# Patient Record
Sex: FEMALE | Race: BLACK OR AFRICAN AMERICAN | NOT HISPANIC OR LATINO | URBAN - METROPOLITAN AREA
[De-identification: names, ages, dates, MRNs, and addresses within clinical notes are randomized per-mention and may not be internally consistent; named-entity substitution may affect disease eponyms.]

---

## 2022-09-02 ENCOUNTER — OFFICE VISIT (OUTPATIENT)
Dept: FAMILY MEDICINE CLINIC | Facility: CLINIC | Age: 27
End: 2022-09-02

## 2022-09-02 VITALS
OXYGEN SATURATION: 100 % | DIASTOLIC BLOOD PRESSURE: 83 MMHG | RESPIRATION RATE: 16 BRPM | SYSTOLIC BLOOD PRESSURE: 122 MMHG | TEMPERATURE: 96.3 F | HEART RATE: 65 BPM

## 2022-09-02 DIAGNOSIS — Z87.42 HISTORY OF ABNORMAL CERVICAL PAP SMEAR: ICD-10-CM

## 2022-09-02 DIAGNOSIS — R63.5 WEIGHT GAIN: ICD-10-CM

## 2022-09-02 DIAGNOSIS — F41.9 ANXIETY: ICD-10-CM

## 2022-09-02 DIAGNOSIS — Z00.00 ANNUAL PHYSICAL EXAM: Primary | ICD-10-CM

## 2022-09-02 DIAGNOSIS — Z00.00 ENCOUNTER FOR MEDICAL EXAMINATION TO ESTABLISH CARE: ICD-10-CM

## 2022-09-02 DIAGNOSIS — Z00.00 ENCOUNTER FOR PREVENTIVE CARE: ICD-10-CM

## 2022-09-02 PROCEDURE — 99213 OFFICE O/P EST LOW 20 MIN: CPT | Performed by: STUDENT IN AN ORGANIZED HEALTH CARE EDUCATION/TRAINING PROGRAM

## 2022-09-02 PROCEDURE — 99385 PREV VISIT NEW AGE 18-39: CPT | Performed by: STUDENT IN AN ORGANIZED HEALTH CARE EDUCATION/TRAINING PROGRAM

## 2022-09-03 NOTE — PROGRESS NOTES
Chief Complaint  Pt presented to clinic with   Chief Complaint   Patient presents with   • Establish Care     UNSTABLE WEIGHT STARETD ABOUT 7MOS AGO          History of Present Illness  Ms. Mcpherson 26-year-old pleasant female who presented to the clinic for the first time for establishing medical care.  Patient stated she recently has done her Pap smear with OB/GYN and she was advised to check her thyroid function test because of her symptoms.  Patient stated her periods are not regular.  Her last LMP was August 1st week and her periods comes every 2 to 2 and half month and she feels like she always have heavy bleeding.  Patient also stated she was diagnosed with anxiety in 2020.  She is not on any medication for her anxiety.  Since she has not been seen by any physician in last few years she she would like to do annual physical.  She also stated recently she has gained weight without having change in any of his diet or lifestyle.  She also noticed weight loss also in the last year  Review of Systems   Constitutional: Negative for activity change, appetite change, fatigue, fever and unexpected weight change.   HENT: Negative for congestion, rhinorrhea, sore throat and voice change.    Respiratory: Negative for cough, chest tightness, shortness of breath and wheezing.    Cardiovascular: Negative for chest pain and palpitations.   Gastrointestinal: Negative for abdominal distention, abdominal pain, diarrhea, nausea and vomiting.   Genitourinary: Positive for menstrual problem. Negative for difficulty urinating and dysuria.   Musculoskeletal: Negative for arthralgias and myalgias.   Skin: Negative for rash.   Neurological: Negative for dizziness, tremors and weakness.   Hematological: Negative for adenopathy.   Psychiatric/Behavioral: Negative for sleep disturbance. The patient is nervous/anxious.        PMH:   No outpatient medications prior to visit.     No facility-administered medications prior to visit.      No  Known Allergies  History reviewed. No pertinent surgical history.  family history is not on file.   has no history on file for tobacco use, alcohol use, and drug use.     /83 (BP Location: Left arm, Patient Position: Sitting)   Pulse 65   Temp 96.3 °F (35.7 °C) (Oral)   Resp 16   SpO2 100%   Physical Exam  HENT:      Head: Normocephalic and atraumatic.      Mouth/Throat:      Mouth: Mucous membranes are moist.      Pharynx: Oropharynx is clear.   Eyes:      Extraocular Movements: Extraocular movements intact.      Conjunctiva/sclera: Conjunctivae normal.      Pupils: Pupils are equal, round, and reactive to light.   Cardiovascular:      Rate and Rhythm: Normal rate and regular rhythm.   Pulmonary:      Effort: Pulmonary effort is normal.      Breath sounds: Normal breath sounds.   Abdominal:      General: Bowel sounds are normal.      Palpations: Abdomen is soft.   Musculoskeletal:         General: Normal range of motion.      Cervical back: Neck supple.   Skin:     General: Skin is warm.      Capillary Refill: Capillary refill takes less than 2 seconds.   Neurological:      General: No focal deficit present.      Mental Status: She is alert and oriented to person, place, and time. Mental status is at baseline.   Psychiatric:         Mood and Affect: Mood normal.          Diagnoses and all orders for this visit:    1. Annual physical exam (Primary)  Comments:  Labs have been ordered, will follow up    2. Encounter for medical examination to establish care  -     CBC Auto Differential  -     Comprehensive Metabolic Panel  -     Lipid Panel With / Chol / HDL Ratio  -     TSH+Free T4  -     Vitamin D 25 Hydroxy    3. Weight gain  Comments:  TSH has been ordered, will follow up  Orders:  -     CBC Auto Differential  -     Comprehensive Metabolic Panel  -     Lipid Panel With / Chol / HDL Ratio  -     TSH+Free T4  -     Vitamin D 25 Hydroxy    4. Anxiety  Comments:  TSH ordered, currently not on any medication  and does not think she needs medication for anxiety    5. Encounter for preventive care  Comments:  She was at her OB/GYN office few months ago, not able to find report of Pap smear, will clarify about this in the next visit with the patient    6. History of abnormal cervical Pap smear  Comments:  2018 patient has abnormal Pap and undergo cone biopsy.        Needs yearly Flu vaccine  Dental visit and exam every year  Seat Belt always when driving or riding cars  Safe sex life to avoid STD  Healthy heart diet  Exercise 3 times a week    Follow Up  3 months

## 2022-09-10 LAB
25(OH)D3+25(OH)D2 SERPL-MCNC: 28.7 NG/ML (ref 30–100)
ALBUMIN SERPL-MCNC: 4.8 G/DL (ref 3.5–5.2)
ALBUMIN/GLOB SERPL: 2.1 G/DL
ALP SERPL-CCNC: 53 U/L (ref 39–117)
ALT SERPL-CCNC: 8 U/L (ref 1–33)
AST SERPL-CCNC: 12 U/L (ref 1–32)
BASOPHILS # BLD AUTO: 0.06 10*3/MM3 (ref 0–0.2)
BASOPHILS NFR BLD AUTO: 1.1 % (ref 0–1.5)
BILIRUB SERPL-MCNC: 0.7 MG/DL (ref 0–1.2)
BUN SERPL-MCNC: 12 MG/DL (ref 6–20)
BUN/CREAT SERPL: 14 (ref 7–25)
CALCIUM SERPL-MCNC: 9.9 MG/DL (ref 8.6–10.5)
CHLORIDE SERPL-SCNC: 106 MMOL/L (ref 98–107)
CHOLEST SERPL-MCNC: 227 MG/DL (ref 0–200)
CHOLEST/HDLC SERPL: 2.44 {RATIO}
CO2 SERPL-SCNC: 25.8 MMOL/L (ref 22–29)
CREAT SERPL-MCNC: 0.86 MG/DL (ref 0.57–1)
EGFRCR-CYS SERPLBLD CKD-EPI 2021: 95.7 ML/MIN/1.73
EOSINOPHIL # BLD AUTO: 0.06 10*3/MM3 (ref 0–0.4)
EOSINOPHIL NFR BLD AUTO: 1.1 % (ref 0.3–6.2)
ERYTHROCYTE [DISTWIDTH] IN BLOOD BY AUTOMATED COUNT: 12.3 % (ref 12.3–15.4)
GLOBULIN SER CALC-MCNC: 2.3 GM/DL
GLUCOSE SERPL-MCNC: 84 MG/DL (ref 65–99)
HCT VFR BLD AUTO: 41.8 % (ref 34–46.6)
HDLC SERPL-MCNC: 93 MG/DL (ref 40–60)
HGB BLD-MCNC: 13.8 G/DL (ref 12–15.9)
IMM GRANULOCYTES # BLD AUTO: 0.01 10*3/MM3 (ref 0–0.05)
IMM GRANULOCYTES NFR BLD AUTO: 0.2 % (ref 0–0.5)
LDLC SERPL CALC-MCNC: 126 MG/DL (ref 0–100)
LYMPHOCYTES # BLD AUTO: 2.99 10*3/MM3 (ref 0.7–3.1)
LYMPHOCYTES NFR BLD AUTO: 53.5 % (ref 19.6–45.3)
MCH RBC QN AUTO: 30.1 PG (ref 26.6–33)
MCHC RBC AUTO-ENTMCNC: 33 G/DL (ref 31.5–35.7)
MCV RBC AUTO: 91.3 FL (ref 79–97)
MONOCYTES # BLD AUTO: 0.44 10*3/MM3 (ref 0.1–0.9)
MONOCYTES NFR BLD AUTO: 7.9 % (ref 5–12)
NEUTROPHILS # BLD AUTO: 2.03 10*3/MM3 (ref 1.7–7)
NEUTROPHILS NFR BLD AUTO: 36.2 % (ref 42.7–76)
NRBC BLD AUTO-RTO: 0 /100 WBC (ref 0–0.2)
PLATELET # BLD AUTO: 271 10*3/MM3 (ref 140–450)
POTASSIUM SERPL-SCNC: 4.5 MMOL/L (ref 3.5–5.2)
PROT SERPL-MCNC: 7.1 G/DL (ref 6–8.5)
RBC # BLD AUTO: 4.58 10*6/MM3 (ref 3.77–5.28)
SODIUM SERPL-SCNC: 141 MMOL/L (ref 136–145)
T4 FREE SERPL-MCNC: 1.19 NG/DL (ref 0.93–1.7)
TRIGL SERPL-MCNC: 44 MG/DL (ref 0–150)
TSH SERPL DL<=0.005 MIU/L-ACNC: 1.24 UIU/ML (ref 0.27–4.2)
VLDLC SERPL CALC-MCNC: 8 MG/DL (ref 5–40)
WBC # BLD AUTO: 5.59 10*3/MM3 (ref 3.4–10.8)

## 2022-09-13 ENCOUNTER — OFFICE VISIT (OUTPATIENT)
Dept: FAMILY MEDICINE CLINIC | Facility: CLINIC | Age: 27
End: 2022-09-13

## 2022-09-13 VITALS
RESPIRATION RATE: 18 BRPM | DIASTOLIC BLOOD PRESSURE: 83 MMHG | OXYGEN SATURATION: 99 % | TEMPERATURE: 97.8 F | SYSTOLIC BLOOD PRESSURE: 124 MMHG | WEIGHT: 150 LBS | HEART RATE: 65 BPM | HEIGHT: 62 IN | BODY MASS INDEX: 27.6 KG/M2

## 2022-09-13 DIAGNOSIS — E55.9 VITAMIN D DEFICIENCY: Primary | ICD-10-CM

## 2022-09-13 DIAGNOSIS — E78.5 HYPERLIPIDEMIA, UNSPECIFIED HYPERLIPIDEMIA TYPE: ICD-10-CM

## 2022-09-13 DIAGNOSIS — Z13.89 SCREENING FOR MULTIPLE CONDITIONS: ICD-10-CM

## 2022-09-13 PROCEDURE — 99214 OFFICE O/P EST MOD 30 MIN: CPT | Performed by: STUDENT IN AN ORGANIZED HEALTH CARE EDUCATION/TRAINING PROGRAM

## 2022-09-13 NOTE — PROGRESS NOTES
"Chief Complaint  Results (LAB FU)    Subjective        Fabiola Espinal presents to [facility]  For follow up on abnormal labs, denies having any new complaints. Pap smear done in June 20222, reported normal as per pt.  Review of system is negative for fever, headache, chest pain, shortness of breath, palpitation, nausea, vomiting, any recent change in bladder habits.        Objective   Vital Signs:  /83 (BP Location: Right arm, Patient Position: Sitting)   Pulse 65   Temp 97.8 °F (36.6 °C) (Oral)   Resp 18   Ht 158 cm (62.21\")   Wt 68 kg (150 lb)   SpO2 99%   BMI 27.25 kg/m²   Estimated body mass index is 27.25 kg/m² as calculated from the following:    Height as of this encounter: 158 cm (62.21\").    Weight as of this encounter: 68 kg (150 lb).          Physical Exam  HENT:      Head: Normocephalic and atraumatic.      Mouth/Throat:      Mouth: Mucous membranes are moist.      Pharynx: Oropharynx is clear.   Eyes:      Extraocular Movements: Extraocular movements intact.      Conjunctiva/sclera: Conjunctivae normal.      Pupils: Pupils are equal, round, and reactive to light.   Cardiovascular:      Rate and Rhythm: Normal rate and regular rhythm.   Pulmonary:      Effort: Pulmonary effort is normal.      Breath sounds: Normal breath sounds.   Abdominal:      General: Bowel sounds are normal.      Palpations: Abdomen is soft.   Musculoskeletal:         General: Normal range of motion.      Cervical back: Neck supple.   Skin:     General: Skin is warm.      Capillary Refill: Capillary refill takes less than 2 seconds.   Neurological:      General: No focal deficit present.      Mental Status: She is alert and oriented to person, place, and time. Mental status is at baseline.   Psychiatric:         Mood and Affect: Mood normal.        Result Review :    CMP    CMP 9/9/22   Glucose 84   BUN 12   Creatinine 0.86   Sodium 141   Potassium 4.5   Chloride 106   Calcium 9.9 "   Total Protein 7.1   Albumin 4.80   Globulin 2.3   Total Bilirubin 0.7   Alkaline Phosphatase 53   AST (SGOT) 12   ALT (SGPT) 8           CBC    CBC 9/9/22   WBC 5.59   RBC 4.58   Hemoglobin 13.8   Hematocrit 41.8   MCV 91.3   MCH 30.1   MCHC 33.0   RDW 12.3   Platelets 271           CBC w/diff    CBC w/Diff 9/9/22   WBC 5.59   RBC 4.58   Hemoglobin 13.8   Hematocrit 41.8   MCV 91.3   MCH 30.1   MCHC 33.0   RDW 12.3   Platelets 271   Neutrophil Rel % 36.2 (A)   Lymphocyte Rel % 53.5 (A)   Monocyte Rel % 7.9   Eosinophil Rel % 1.1   Basophil Rel % 1.1   (A) Abnormal value            Lipid Panel    Lipid Panel 9/9/22   Total Cholesterol 227 (A)   Triglycerides 44   HDL Cholesterol 93 (A)   VLDL Cholesterol 8   LDL Cholesterol  126 (A)   (A) Abnormal value       Comments are available for some flowsheets but are not being displayed.           TSH    TSH 9/9/22   TSH 1.240                     Assessment and Plan   Diagnoses and all orders for this visit:    1. Vitamin D deficiency (Primary)  Comments:  advise OTC vit d daily pills ~ 800 to 1000 IU , will recheck level in a year    2. Hyperlipidemia, unspecified hyperlipidemia type  Comments:  Advise lifestyle modifications, Recheck lipid panel in a year, advise avoid yellow portion of eggs, less red meat consumption    3. Screening for multiple conditions  -     CBC Auto Differential  -     Comprehensive Metabolic Panel  -     Vitamin D 25 Hydroxy  -     Lipid Panel With / Chol / HDL Ratio  -     TSH             Follow Up   Return in about 1 year (around 9/13/2023) for Annual physical.  Patient was given instructions and counseling regarding her condition or for health maintenance advice. Please see specific information pulled into the AVS if appropriate.